# Patient Record
Sex: MALE | Race: ASIAN | Employment: FULL TIME | URBAN - METROPOLITAN AREA
[De-identification: names, ages, dates, MRNs, and addresses within clinical notes are randomized per-mention and may not be internally consistent; named-entity substitution may affect disease eponyms.]

---

## 2022-02-04 ENCOUNTER — OFFICE VISIT (OUTPATIENT)
Dept: ORTHOPEDICS | Facility: CLINIC | Age: 32
End: 2022-02-04
Payer: COMMERCIAL

## 2022-02-04 DIAGNOSIS — G89.29 CHRONIC PAIN OF RIGHT KNEE: Primary | ICD-10-CM

## 2022-02-04 DIAGNOSIS — M25.561 CHRONIC PAIN OF RIGHT KNEE: Primary | ICD-10-CM

## 2022-02-04 PROCEDURE — 99203 OFFICE O/P NEW LOW 30 MIN: CPT | Performed by: FAMILY MEDICINE

## 2022-02-04 NOTE — LETTER
2/4/2022         RE: Nish Worrell  [address]  [facility] [address]        Dear Colleague,    Thank you for referring your patient, Nish Worrell, to the [facility] [place]. Please see a copy of my visit note below.      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  Suspect previous MPFL injury has healed.  No signs of patellar instability    Reviewed imaging and assessment with patient in detail  Recommended course of physical therapy for patellofemoral etiology.  We discussed the use of a patellar cutout knee brace or taping.  Okay to gradually return to activity.  Follow-up if having difficulty returning to regular level of physical activity without a problem.    Roly Cardenas MD  [facility]    -----  Chief Complaint   Patient presents with     Consult     right knee pain        SUBJECTIVE  Nish Worrell is a/an 31 year old male who is seen as a self referral for evaluation of  Right knee pain.     The patient is seen by themselves.    Onset: over a year ago. MPFL tear playing tennis. Dx at [facility]. Did not have patellar dislocation  Location of Pain: right knee  Worsened by: use, activity  Better with: rest, brace  Treatments tried: physical therapy for 1-2 months.   Associated symptoms: no distal numbness or tingling; denies swelling or warmth    Orthopedic/Surgical history: NO  Social History/Occupation: [employer]       REVIEW OF SYSTEMS:    Do you have fever, chills, weight loss? No    Do you have any vision problems? No    Do you have any chest pain or edema? No    Do you have any shortness of breath or wheezing?  No    Do you have stomach problems? No    Do you have any numbness or focal weakness? No    Do you have diabetes? No    Do you have problems with bleeding or clotting? No    Do you  have an rashes or other skin lesions? No    OBJECTIVE:      Patient is alert, No acute distress, pleasant and conversational.    Gait: nonantalgic. Normal heel toe gait.    Patient is able to perform two legged squat without difficulty.    right knee:   Skin intact. No erythema or ecchymosis.  No effusion or soft tissue swelling.    AROM: Zero to approximately 135  without restriction or reported pain.    Palpation: No medial or lateral facet joint tenderness.  No posterior medial or posterior lateral joint line tenderness     Special Tests:  Negative bounce test, negative forced flexion and negative Hi's.  No ligamentous laxity or pain with valgus or varus stress.  Negative Lachman's, Anterior Drawer and Posterior Drawer     Full Isometric quad strength, extensor mechanism in place     Neurovascularly intact in the lower extremity    Hip and Ankle with full AROM and nontender      RADIOLOGY:    No imaging this visit          Again, thank you for allowing me to participate in the care of your patient.        Sincerely,        Roly Cardenas MD

## 2022-02-04 NOTE — PROGRESS NOTES
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  Suspect previous MPFL injury has healed.  No signs of patellar instability    Reviewed imaging and assessment with patient in detail  Recommended course of physical therapy for patellofemoral etiology.  We discussed the use of a patellar cutout knee brace or taping.  Okay to gradually return to activity.  Follow-up if having difficulty returning to regular level of physical activity without a problem.    Roly Cardenas MD  [facility] [place]    -----  Chief Complaint   Patient presents with     Consult     right knee pain        SUBJECTIVE  Nish Worrell is a/an 31 year old male who is seen as a self referral for evaluation of  Right knee pain.     The patient is seen by themselves.    Onset: over a year ago. MPFL tear playing tennis. Dx at [facility]. Did not have patellar dislocation  Location of Pain: right knee  Worsened by: use, activity  Better with: rest, brace  Treatments tried: physical therapy for 1-2 months.   Associated symptoms: no distal numbness or tingling; denies swelling or warmth    Orthopedic/Surgical history: NO  Social History/Occupation: [employer]       REVIEW OF SYSTEMS:    Do you have fever, chills, weight loss? No    Do you have any vision problems? No    Do you have any chest pain or edema? No    Do you have any shortness of breath or wheezing?  No    Do you have stomach problems? No    Do you have any numbness or focal weakness? No    Do you have diabetes? No    Do you have problems with bleeding or clotting? No    Do you have an rashes or other skin lesions? No    OBJECTIVE:      Patient is alert, No acute distress, pleasant and conversational.    Gait: nonantalgic. Normal heel toe gait.    Patient is able to perform two legged squat without difficulty.    right knee:   Skin intact. No  erythema or ecchymosis.  No effusion or soft tissue swelling.    AROM: Zero to approximately 135  without restriction or reported pain.    Palpation: No medial or lateral facet joint tenderness.  No posterior medial or posterior lateral joint line tenderness     Special Tests:  Negative bounce test, negative forced flexion and negative Hi's.  No ligamentous laxity or pain with valgus or varus stress.  Negative Lachman's, Anterior Drawer and Posterior Drawer     Full Isometric quad strength, extensor mechanism in place     Neurovascularly intact in the lower extremity    Hip and Ankle with full AROM and nontender      RADIOLOGY:    No imaging this visit

## 2022-02-07 ENCOUNTER — THERAPY VISIT (OUTPATIENT)
Dept: PHYSICAL THERAPY | Facility: CLINIC | Age: 32
End: 2022-02-07
Attending: FAMILY MEDICINE
Payer: COMMERCIAL

## 2022-02-07 DIAGNOSIS — G89.29 CHRONIC PAIN OF RIGHT KNEE: ICD-10-CM

## 2022-02-07 DIAGNOSIS — M23.8X1 CREPITUS OF JOINT OF RIGHT KNEE: ICD-10-CM

## 2022-02-07 DIAGNOSIS — M25.561 CHRONIC PAIN OF RIGHT KNEE: ICD-10-CM

## 2022-02-07 PROCEDURE — 97110 THERAPEUTIC EXERCISES: CPT | Mod: GP | Performed by: PHYSICAL THERAPIST

## 2022-02-07 PROCEDURE — 97161 PT EVAL LOW COMPLEX 20 MIN: CPT | Mod: GP | Performed by: PHYSICAL THERAPIST

## 2022-02-07 ASSESSMENT — ACTIVITIES OF DAILY LIVING (ADL)
SWELLING: I DO NOT HAVE THE SYMPTOM
WALK: ACTIVITY IS NOT DIFFICULT
RISE FROM A CHAIR: ACTIVITY IS NOT DIFFICULT
KNEE_ACTIVITY_OF_DAILY_LIVING_SCORE: 92.86
KNEE_ACTIVITY_OF_DAILY_LIVING_SUM: 65
GO UP STAIRS: ACTIVITY IS MINIMALLY DIFFICULT
SIT WITH YOUR KNEE BENT: ACTIVITY IS NOT DIFFICULT
GO DOWN STAIRS: ACTIVITY IS NOT DIFFICULT
HOW_WOULD_YOU_RATE_THE_OVERALL_FUNCTION_OF_YOUR_KNEE_DURING_YOUR_USUAL_DAILY_ACTIVITIES?: NORMAL
LIMPING: I DO NOT HAVE THE SYMPTOM
AS_A_RESULT_OF_YOUR_KNEE_INJURY,_HOW_WOULD_YOU_RATE_YOUR_CURRENT_LEVEL_OF_DAILY_ACTIVITY?: NORMAL
KNEEL ON THE FRONT OF YOUR KNEE: ACTIVITY IS NOT DIFFICULT
WEAKNESS: I HAVE THE SYMPTOM BUT IT DOES NOT AFFECT MY ACTIVITY
RAW_SCORE: 65
PAIN: I HAVE THE SYMPTOM BUT IT DOES NOT AFFECT MY ACTIVITY
STAND: ACTIVITY IS NOT DIFFICULT
SQUAT: ACTIVITY IS NOT DIFFICULT
STIFFNESS: I HAVE THE SYMPTOM BUT IT DOES NOT AFFECT MY ACTIVITY
HOW_WOULD_YOU_RATE_THE_CURRENT_FUNCTION_OF_YOUR_KNEE_DURING_YOUR_USUAL_DAILY_ACTIVITIES_ON_A_SCALE_FROM_0_TO_100_WITH_100_BEING_YOUR_LEVEL_OF_KNEE_FUNCTION_PRIOR_TO_YOUR_INJURY_AND_0_BEING_THE_INABILITY_TO_PERFORM_ANY_OF_YOUR_USUAL_DAILY_ACTIVITIES?: 95
GIVING WAY, BUCKLING OR SHIFTING OF KNEE: I HAVE THE SYMPTOM BUT IT DOES NOT AFFECT MY ACTIVITY

## 2022-02-07 NOTE — PROGRESS NOTES
[facility] Initial Evaluation -- Lower Extremity    Evaluation Date: February 7, 2022  Nish Worrell is a 31 year old male with a R knee condition.   Referral: Dr. Cardenas  Work mechanical stresses: prolonged sitting, computer work ()   Employment status: working full time remotely  Leisure mechanical stresses: cricket, tennis, family  Functional disability score: ADLS 92.86  VAS score (0-10): 0/10  Patient goals/expectations:  Alleviate crepitus with activity    HISTORY:    Present symptoms: patient relates that he is experiencing crepitus with lunging type of movements with the R LE.  He does not feel pain with the `noise but the noise is bothersome and does not want his knee to worsen with activity  Thus, seeking PT to determine if he can do exercise to alleviate the crepitus with activity.   Pain quality (sharp/shooting/stabbing/aching/burning/cramping):  Crepitus.  Strain anterior knee with trial of a controlled step down    Present since (onset date): Possible MPFL injury a year ago.  MD referral 2/4/2022    Symptoms (improving/unchanging/worsening):  unchanging.      Symptoms commenced as a result of: Injury to R knee when playing tennis last winter 2021   Condition occurred in the following environment: indoor tennis     Symptoms at onset: medial R knee  Paresthesia (yes/no):  no  Spinal history: no   Cough/Sneeze (pos/neg):  negative    Constant symptoms: none  Intermittent symptoms: crepitus R knee    Symptoms are worse with the following: Always Bending of the knee in weight bearing, Always Squatting and Other - anterior/lateral lunge   Symptoms are better with the following: Other - avoiding activity that produces the crepitus.    Continued use makes the pain (better/worse/no effect): no effect    Disturbed night (yes/no): none      Pain at rest (yes/no):  no  Site (back/hip/knee/ankle/foot):  knee    Other questions (swelling/clicking/locking/giving way/falling):  Crepitus,  clicking      Previous episodes: no   Previous treatments: wearing brace for the knees when playing sports, tennis and cricket    Specific Questions:  General health (excellent/good/fair/poor):  excellent  Pertinent medical history includes: None  Medications (nil/NSAIDS/analg/steroids/anticoag/other):  None  Medical allergies:  none  Imaging (none/Xray/MRI/other):  Not this episode  Recent or major surgery (yes/no):  no  Night pain (yes/no):  no  Accidents (yes/no):  no  Unexplained weight loss (yes/no):  no  Barriers at home: no  Other red flags: no    Sites for physical examination (back/hip/knee/ankle/foot/other): knee    EXAMINATION    Posture:  Sitting (good/fair/poor): poor    Correction of Posture (better/worse/no effect/NA): no effect  Standing (good/fair/poor): good  Other observations:  none    Neurological: (NA/motor/sensory/reflexes/dural): not assessed    Baselines (pain or functional activity): end range tightness of knee flexion     Extremities (Hip / Knee / Ankle / Foot): R knee    Movement Loss Lee Mod Min Nil Pain   Flexion    x none   Extension    x none   Abduction        Adduction        Internal Rotation        External Rotation        Dorsiflexion        Plantarflexion        Inversion        Eversion          Passive Movement (+/- over pressure)/(PDM/ERP  End range tightness with flexion and extension   Resisted Test Response (pain): strong and without pain for knee flexion and extension   Other Tests: not tender about the medial knee or joint line  Produced symptoms anterior knee with lateral step downs    Spine:  Movement loss: not assessed  Effect of repeated movements:   Effect of static positioning:   Spine testing (not relevant/relevant/secondary problem):     Baseline Symptoms: crepitus R knee with anterior/lateral lunge  Repeated Tests Symptom Response Mechanical Response   Active/Passive movement, resisted test, functional test During -  Produce, Abolish, Increase, Decrease, NE  "After -  Better, Worse, NB, NW, NE Effect -   ? or ? ROM, strength or key functional test No   Effect   Patella mobilization inferior glide No Effect    No Effect     No effect on crepitus   Active knee extension with quad set at end range No Effect    No Effect    Improved end range ext ROM, less crepitus with ant/lat lunge    Passive knee extension with self o/p  No Effect    No Effect     x   Lateral step downs, 6\" Produce  Anterior knee    No Worse     x   Effect of static positioning                 Provisional Classification (Extremity/Spine):  Extremity - Inconclusive/Other - Mechanically Inconclusive (previous trauma     Principle of Management:   Education:  Discussed mobilizing both the patellofemoral joint with mobilizations of the patella and Tibiofemoral with active and passive knee extension.  Adding strengthening with lateral step downs.  Need to keep exercise in painfree range of motion and monitor that his knee extension does not feel stiff afterwards. Discussed that the crepitus can improve with strengthening and ROM but can take time to strengthen and noise may not fully resolve.  Patient willing to work had to alleviate symptoms as he does not want any injury to the knee joint from increasing activity with the knee as will begin playing cricket in the spring.     Equipment provided:  none  Exercise and dosage:  Patella inferior glides 30 sec hold, 5 reps, 2 times a day, active and passive knee extension 10 reps each, 4 times a day and lateral step downs 10 reps, 1 time a day.     ASSESSMENT/PLAN:    Patient is a 31 year old male with right side knee complaints.    Patient has the following significant findings with corresponding treatment plan.                Diagnosis 1:  R knee patellofemoral joint chondromalacia    Pain -  manual therapy, splint/taping/bracing/orthotics, self management, education, directional preference exercise and home program  Decreased ROM/flexibility - manual therapy, " therapeutic exercise and home program  Decreased strength - therapeutic exercise, therapeutic activities and home program  Decreased proprioception - neuro re-education, therapeutic activities and home program  Decreased function - therapeutic activities and home program    Therapy Evaluation Codes:   1) History comprised of:   Personal factors that impact the plan of care:      None.    Comorbidity factors that impact the plan of care are:      None.     Medications impacting care: None.  2) Examination of Body Systems comprised of:   Body structures and functions that impact the plan of care:      Knee.   Activity limitations that impact the plan of care are:      Bending, Squatting/kneeling and lunge, controlled step down.  3) Clinical presentation characteristics are:   Stable/Uncomplicated.  4) Decision-Making    Low complexity using standardized patient assessment instrument and/or measureable assessment of functional outcome.  Cumulative Therapy Evaluation is: Low complexity.    Previous and current functional limitations:  (See Goal Flow Sheet for this information)    Short term and Long term goals: (See Goal Flow Sheet for this information)     Communication ability:  Patient appears to be able to clearly communicate and understand verbal and written communication and follow directions correctly.  Treatment Explanation - The following has been discussed with the patient:   RX ordered/plan of care  Anticipated outcomes  Possible risks and side effects  This patient would benefit from PT intervention to resume normal activities.   Rehab potential is good.    Frequency:  1 X week, once daily  Duration:  for 4 weeks tapering to 1 X every other week over 4 weeks  Discharge Plan:  Achieve all LTG.  Independent in home treatment program.  Reach maximal therapeutic benefit.    Please refer to the daily flowsheet for treatment today, total treatment time and time spent performing 1:1 timed codes.

## 2022-06-20 PROBLEM — M25.561 CHRONIC PAIN OF RIGHT KNEE: Status: RESOLVED | Noted: 2022-02-07 | Resolved: 2022-06-20

## 2022-06-20 PROBLEM — M23.8X1 CREPITUS OF JOINT OF RIGHT KNEE: Status: RESOLVED | Noted: 2022-02-07 | Resolved: 2022-06-20

## 2022-06-20 PROBLEM — G89.29 CHRONIC PAIN OF RIGHT KNEE: Status: RESOLVED | Noted: 2022-02-07 | Resolved: 2022-06-20
